# Patient Record
Sex: MALE | Race: WHITE | Employment: FULL TIME | URBAN - METROPOLITAN AREA
[De-identification: names, ages, dates, MRNs, and addresses within clinical notes are randomized per-mention and may not be internally consistent; named-entity substitution may affect disease eponyms.]

---

## 2022-09-23 NOTE — PROGRESS NOTES
[address] [place] [address] Court, [address] [facility]  [phone]    Patient Name:  Issac Gauthier  YOB: 1980      Office Visit 9/26/2022    CHIEF COMPLAINT:    Chief Complaint   Patient presents with    New Patient       HISTORY OF PRESENT ILLNESS:      The patient presents in outpatient consultation at the request of Dr. Ayaan Denton for evaluation of possible sleep apnea. Significant PMH of hypertension and GERD. Patient reports that a few weeks ago he was having problems with awaking himself during the night several times. States that for some reason this seems to have gotten better. States he lives alone and cannot remember anyone ever telling him that he snores. However he has noticed awakening especially when he falls asleep on the couch to gasp for air. Reports he usually sleeps in the side-lying position but has noticed that these awakenings happen when he is lying on his back. He denies any difficulty going to sleep at bedtime. Denies having any headaches upon awakening. Reports his normal sleep schedule is from 130-2 AM until 9:30 AM and he awakens to alarm clock. States that he sleeps 6 hours because he works in the afternoons. He denies any excessive movement or disheveled sheets. Denies any history of sleepwalking or acting out dreams. He does report a slight increase in daytime fatigue but states he never feels like he needs to take a nap. Fletcher score is 5/24. States he will drink coffee in the a.m. and may occasionally have an soda and afternoon. He denies any alcohol use, tobacco use, or illicit drug use. Reports family history of father having sleep apnea. States that his weight has been consistently around 295 pounds for years. He has been trying to increase walking to help with weight loss. He has been on blood pressure medication for about 3 to 4 years and his blood pressure is controlled today.   He denies any problems with of fever, chills, night sweats, weight loss, weight gain, persistent fatigue, or lethargy/hypersomnolence. EYES:   Denies problems with eye pain, erythema, blurred vision, or visual field loss. ENTM:   Denies history of tinnitus, epistaxis, sore throat, hoarseness, or dysphonia. LYMPH:   Denies swollen glands. CARDIAC:   No chest pain, pressure, discomfort, palpitations, orthopnea, murmurs, or edema. GI:   No dysphagia, heartburn reflux, nausea/vomiting, diarrhea, abdominal pain, or bleeding. :   Denies history of dysuria, hematuria, polyuria, or decreased urine output. MS:   No history of myalgias, arthralgias, bone pain, or muscle cramps. SKIN:   No history of rashes, jaundice, cyanosis, nodules, or ulcers. ENDO:   Negative for heat or cold intolerance. No history of DM. PSYCH:   Negative for anxiety, depression, insomnia, hallucinations. NEURO:   There is no history of AMS, persistent headache, decreased level of consciousness, seizures, or motor or sensory deficits. PHYSICAL EXAM:    Vitals:    09/26/22 1346   BP: 128/82   Pulse: 77   Resp: 15   Temp: 97.3 °F (36.3 °C)   SpO2: 97%   Weight: (!) 305 lb 6.4 oz (138.5 kg)   Height: 5' 6\" (1.676 m)        GENERAL APPEARANCE:   The patient is normal weight and in no respiratory distress. HEENT:   PERRL. Conjunctivae unremarkable. Nasal mucosa is without epistaxis, exudate, or polyps. Nares patent bilateral.  Gums and dentition are unremarkable. There is oropharyngeal narrowing. Cleaning score 4. NECK/LYMPHATIC:   Symmetrical with no elevation of jugular venous pulsation. Trachea midline. No thyroid enlargement. No cervical adenopathy. LUNGS:   Normal respiratory effort with symmetrical lung expansion. Breath sounds clear. HEART:   There is a regular rate and rhythm. No murmur, rub, or gallop. There is no edema in the lower extremities. ABDOMEN:   Soft and non-tender. No hepatosplenomegaly.   Bowel sounds are normal. SKIN:   There are no rashes, cyanosis, jaundice, or ecchymosis present. EXTREMITIES:   The extremities are unremarkable without clubbing, cyanosis, joint inflammation, degenerative, or ischemic change. MUSCULOSKELETAL:   There is no abnormal tone, muscle atrophy, or abnormal movement present. NEURO:   The patient is alert and oriented to person, place, and time. Memory appears intact and mood is normal.  No gross sensorimotor deficits are present. ASSESSMENT:  (Medical Decision Making)         ICD-10-CM    1. Suspected sleep apnea  R29.818 Ambulatory Referral to Sleep Studies -The pathophysiology of obstructive sleep apnea was reviewed with the patient. It's potential to promote severe neurologic, cardiac, pulmonary, and gastrointestinal problems was discussed. Specifically, the increased incidence of hypertension, coronary artery disease, congestive heart failure, pulmonary hypertension, gastroesophageal reflux, pathologic hypersomnolence, memory loss, and glucose intolerance was related to the consequences of hypoxemia, hypercapnia, airway obstruction, and sympathetic overdrive. We also discussed the ability of nasal CPAP to correct these abnormalities through maintenance of a patent airway. Therapeutic options including surgery, oral appliances, and weight loss were also reviewed. Patient is agreeable to starting CPAP therapy if sleep study positive for sleep apnea. 2. Snoring  R06.83 Advised to sleep in the side-lying position and avoid sleeping supine. 3. Class 3 severe obesity due to excess calories without serious comorbidity with body mass index (BMI) of 45.0 to 49.9 in adult [facility])  E66.01 Patient can lose weight including ambulating 8-10,000 steps. Can Build Up Slowly as tolerated to this goal.    Also modifying dietary intake with decrease carbohydrates and sweets.  Told to use avoid the P's --> Pizza, Pasta, Pastry, etc.  Increase fruits, vegetables, and lean meats e.g. [place] ([facility]), chicken or game meat. Patient is aware the goal is to consume less than 2000 merlin/day, since patient is a nondiabetic. We discussed using the [place] LOSE IT to count calories. Patient can police themselves. If the future, if still having issues can use a more regimented diet e.g. [place], [address] etc. Clinical correlation suggested. Z68.42       4. Hypersomnia  G47.10 Anticipate this is caused by sleep apnea and will reevaluate at follow-up. 5. Persistent disorder of initiating or maintaining sleep  G47.00 Anticipate the awakenings are being caused by snoring and undiagnosed sleep apnea. We will reevaluate at follow-up              PLAN:  Home sleep study ordered  Recommendations as above  Appropriate handouts were given to patient including information on sleep apnea, sleep hygiene and PAP therapy. Follow up with the Sleep Center will be after sleep study or after starting CPAP therapy or sooner if needed          Orders Placed This Encounter   Procedures    Ambulatory Referral to Sleep Studies     Referral Priority:   Routine     Referral Type:   Consult for Advice and Opinion     Referral Reason:   Specialty Services Required     Number of Visits Requested:   1              Collaborating Physician: Dr. Aim Boyce     Over 50% of today's office visit was spent in face to face time reviewing test results, prognosis, importance of compliance, education about disease process, benefits of medications, instructions for management of acute flare-ups, and follow up plans. Total face to face time spent with patient was 40 minutes.     [address], APRN - CNP  Electronically signed

## 2022-09-26 ENCOUNTER — OFFICE VISIT (OUTPATIENT)
Dept: SLEEP MEDICINE | Age: 42
End: 2022-09-26
Payer: COMMERCIAL

## 2022-09-26 VITALS
WEIGHT: 305.4 LBS | HEART RATE: 77 BPM | SYSTOLIC BLOOD PRESSURE: 128 MMHG | DIASTOLIC BLOOD PRESSURE: 82 MMHG | HEIGHT: 66 IN | TEMPERATURE: 97.3 F | BODY MASS INDEX: 49.08 KG/M2 | RESPIRATION RATE: 15 BRPM | OXYGEN SATURATION: 97 %

## 2022-09-26 DIAGNOSIS — R29.818 SUSPECTED SLEEP APNEA: Primary | ICD-10-CM

## 2022-09-26 DIAGNOSIS — G47.10 HYPERSOMNIA: ICD-10-CM

## 2022-09-26 DIAGNOSIS — R06.83 SNORING: ICD-10-CM

## 2022-09-26 DIAGNOSIS — E66.01 CLASS 3 SEVERE OBESITY DUE TO EXCESS CALORIES WITHOUT SERIOUS COMORBIDITY WITH BODY MASS INDEX (BMI) OF 45.0 TO 49.9 IN ADULT (HCC): ICD-10-CM

## 2022-09-26 DIAGNOSIS — G47.00 PERSISTENT DISORDER OF INITIATING OR MAINTAINING SLEEP: ICD-10-CM

## 2022-09-26 PROCEDURE — 99203 OFFICE O/P NEW LOW 30 MIN: CPT | Performed by: NURSE PRACTITIONER

## 2022-09-26 ASSESSMENT — SLEEP AND FATIGUE QUESTIONNAIRES
HOW LIKELY ARE YOU TO NOD OFF OR FALL ASLEEP WHILE SITTING AND TALKING TO SOMEONE: 0
ESS TOTAL SCORE: 5
HOW LIKELY ARE YOU TO NOD OFF OR FALL ASLEEP WHILE LYING DOWN TO REST IN THE AFTERNOON WHEN CIRCUMSTANCES PERMIT: 2
HOW LIKELY ARE YOU TO NOD OFF OR FALL ASLEEP WHILE WATCHING TV: 2
HOW LIKELY ARE YOU TO NOD OFF OR FALL ASLEEP WHILE SITTING AND READING: 0
HOW LIKELY ARE YOU TO NOD OFF OR FALL ASLEEP IN A CAR, WHILE STOPPED FOR A FEW MINUTES IN TRAFFIC: 0
HOW LIKELY ARE YOU TO NOD OFF OR FALL ASLEEP WHILE SITTING INACTIVE IN A PUBLIC PLACE: 0
HOW LIKELY ARE YOU TO NOD OFF OR FALL ASLEEP WHILE SITTING QUIETLY AFTER LUNCH WITHOUT ALCOHOL: 0
HOW LIKELY ARE YOU TO NOD OFF OR FALL ASLEEP WHEN YOU ARE A PASSENGER IN A CAR FOR AN HOUR WITHOUT A BREAK: 1

## 2022-09-26 NOTE — PATIENT INSTRUCTIONS
Home sleep study ordered  Recommendations as above  Appropriate handouts were given to patient including information on sleep apnea, sleep hygiene and PAP therapy.   Follow up with the Sleep Center will be after sleep study or after starting CPAP therapy or sooner if needed

## 2022-11-28 ENCOUNTER — HOSPITAL ENCOUNTER (OUTPATIENT)
Dept: SLEEP CENTER | Age: 42
Discharge: HOME | End: 2022-12-01

## 2022-12-08 ENCOUNTER — TELEPHONE (OUTPATIENT)
Dept: SLEEP MEDICINE | Age: 42
End: 2022-12-08

## 2022-12-08 DIAGNOSIS — G47.33 OSA (OBSTRUCTIVE SLEEP APNEA): Primary | ICD-10-CM

## 2022-12-08 NOTE — TELEPHONE ENCOUNTER
Patient had recent sleep study showing moderate sleep apnea. Cpap therapy is recommended per sleep interp. Patient has agreed to start CPAP therapy. Order needs to be sent to [address] [place] [address].

## 2023-03-24 NOTE — PROGRESS NOTES
[address] [place] [address] Court, [address] [facility]  [phone]    Patient Name:  Jayson Elaine  YOB: 1980      Office Visit 3/27/2023    CHIEF COMPLAINT:    Chief Complaint   Patient presents with    Sleep Apnea    Follow-up    CPAP/BiPAP         HISTORY OF PRESENT ILLNESS:  Patient is a 42 yo  male seen today for follow up of sleep apnea. Patient's HST revealed AHI of 17.5 including 4 central apneas, 72 obstructive apneas and 46 hypopneas. Desaturation as low as 81% seen with SpO2 less than 89% for  1.5 minutes of the test. He is prescribed cpap therapy with a humidifier set at 7-12cm with a full face mask. Most recent download reveals AHI on PAP therapy is 0.4, leak is 9.1 and the hourly usage is 6 hours 33 minutes nightly. The overall use is 87/90 days with days greater than four hours at 86/90. The patient is compliant with the Pap therapy and is feeling better as a result. This is patient's first visit since starting CPAP. Sleep study results were discussed with patient and he was given copy of sleep study. He states he has not felt much difference since starting CPAP. He does feel rested, does have occasional daytime fatigue and denies any napping. Current Eldorado score is 7/24. He is falling asleep easily and denies any frequent nocturnal awakenings. He states that the CPAP pressure feels fine. HST 11/29/2022                E[place]or[place] Sleepiness Scale  Sleep Medicine 3/27/2023 9/26/2022   Sitting and reading 1 0   Watching TV 2 2   Sitting, inactive in a public place (e.g. a theatre or a meeting) 0 0   As a passenger in a car for an hour without a break 1 1   Lying down to rest in the afternoon when circumstances permit 2 2   Sitting and talking to someone 0 0   Sitting quietly after a lunch without alcohol 1 0   In a car, while stopped for a few minutes in traffic 0 0   E[place] Sleepiness Score 7 5          History reviewed.  No

## 2023-03-27 ENCOUNTER — OFFICE VISIT (OUTPATIENT)
Dept: SLEEP MEDICINE | Age: 43
End: 2023-03-27
Payer: COMMERCIAL

## 2023-03-27 VITALS
HEART RATE: 76 BPM | HEIGHT: 66 IN | WEIGHT: 299 LBS | TEMPERATURE: 97.3 F | RESPIRATION RATE: 14 BRPM | DIASTOLIC BLOOD PRESSURE: 82 MMHG | BODY MASS INDEX: 48.05 KG/M2 | SYSTOLIC BLOOD PRESSURE: 114 MMHG | OXYGEN SATURATION: 98 %

## 2023-03-27 DIAGNOSIS — E66.01 MORBID OBESITY WITH BMI OF 45.0-49.9, ADULT (HCC): ICD-10-CM

## 2023-03-27 DIAGNOSIS — G47.33 OSA (OBSTRUCTIVE SLEEP APNEA): Primary | ICD-10-CM

## 2023-03-27 PROCEDURE — 3079F DIAST BP 80-89 MM HG: CPT | Performed by: STUDENT IN AN ORGANIZED HEALTH CARE EDUCATION/TRAINING PROGRAM

## 2023-03-27 PROCEDURE — 3074F SYST BP LT 130 MM HG: CPT | Performed by: STUDENT IN AN ORGANIZED HEALTH CARE EDUCATION/TRAINING PROGRAM

## 2023-03-27 PROCEDURE — 99214 OFFICE O/P EST MOD 30 MIN: CPT | Performed by: STUDENT IN AN ORGANIZED HEALTH CARE EDUCATION/TRAINING PROGRAM

## 2023-03-27 ASSESSMENT — SLEEP AND FATIGUE QUESTIONNAIRES
HOW LIKELY ARE YOU TO NOD OFF OR FALL ASLEEP WHILE SITTING INACTIVE IN A PUBLIC PLACE: 0
ESS TOTAL SCORE: 7
HOW LIKELY ARE YOU TO NOD OFF OR FALL ASLEEP WHILE WATCHING TV: 2
HOW LIKELY ARE YOU TO NOD OFF OR FALL ASLEEP WHILE SITTING AND TALKING TO SOMEONE: 0
HOW LIKELY ARE YOU TO NOD OFF OR FALL ASLEEP WHILE SITTING AND READING: 1
HOW LIKELY ARE YOU TO NOD OFF OR FALL ASLEEP WHILE LYING DOWN TO REST IN THE AFTERNOON WHEN CIRCUMSTANCES PERMIT: 2
HOW LIKELY ARE YOU TO NOD OFF OR FALL ASLEEP WHILE SITTING QUIETLY AFTER LUNCH WITHOUT ALCOHOL: 1
HOW LIKELY ARE YOU TO NOD OFF OR FALL ASLEEP IN A CAR, WHILE STOPPED FOR A FEW MINUTES IN TRAFFIC: 0
HOW LIKELY ARE YOU TO NOD OFF OR FALL ASLEEP WHEN YOU ARE A PASSENGER IN A CAR FOR AN HOUR WITHOUT A BREAK: 1

## 2023-09-14 NOTE — PROGRESS NOTES
[address] [place] [address]  [phone]    Patient Name:  Charlott Closs  YOB: 1980      Office Visit 9/15/2023    CHIEF COMPLAINT:    Chief Complaint   Patient presents with    Sleep Apnea         HISTORY OF PRESENT ILLNESS:  Patient is a 42 yo  male seen today for follow up of sleep apnea. Patient's sleep study in 2022 revealed AHI of 17.5 with lowest desaturation 81%. He is prescribed cpap therapy with a humidifier set at 7-12cm with a full face mask. Most recent download reveals AHI on PAP therapy is 0.2, leak is 2.9 and the hourly usage is 6 hours 51 minutes nightly. The overall use is 172/175 days with days greater than four hours at 172/175. The patient is compliant with the Pap therapy and is feeling better as a result. Patient states he is rested in the morning, denies any daytime fatigue and rarely will take a nap. Current Washington score is 4/24. He is falling asleep easily and denies any frequent nocturnal awakenings however there are some nights that he has difficulty going back to sleep if he does wake up. He states he is not often. Download        [place] Sleepiness Scale      History reviewed. No pertinent past medical history. Patient Active Problem List   Diagnosis    HTN (hypertension)    YAO (obstructive sleep apnea)          History reviewed. No pertinent surgical history.         Social History     Socioeconomic History    Marital status: Single     Spouse name: Not on file    Number of children: Not on file    Years of education: Not on file    Highest education level: Not on file   Occupational History    Not on file   Tobacco Use    Smoking status: Never    Smokeless tobacco: Never   Substance and Sexual Activity    Alcohol use: Not Currently    Drug use: Never    Sexual activity: Not on file   Other Topics Concern    Not on file   Social History Narrative    Not on file     Social Determinants of

## 2023-09-15 ENCOUNTER — OFFICE VISIT (OUTPATIENT)
Dept: SLEEP MEDICINE | Age: 43
End: 2023-09-15
Payer: COMMERCIAL

## 2023-09-15 VITALS
OXYGEN SATURATION: 97 % | HEIGHT: 67 IN | RESPIRATION RATE: 17 BRPM | HEART RATE: 92 BPM | BODY MASS INDEX: 47.1 KG/M2 | WEIGHT: 300.1 LBS | TEMPERATURE: 97.3 F | DIASTOLIC BLOOD PRESSURE: 78 MMHG | SYSTOLIC BLOOD PRESSURE: 118 MMHG

## 2023-09-15 DIAGNOSIS — G47.33 OSA (OBSTRUCTIVE SLEEP APNEA): Primary | ICD-10-CM

## 2023-09-15 DIAGNOSIS — E66.01 MORBID OBESITY WITH BMI OF 45.0-49.9, ADULT (HCC): ICD-10-CM

## 2023-09-15 PROCEDURE — 3074F SYST BP LT 130 MM HG: CPT | Performed by: STUDENT IN AN ORGANIZED HEALTH CARE EDUCATION/TRAINING PROGRAM

## 2023-09-15 PROCEDURE — 99213 OFFICE O/P EST LOW 20 MIN: CPT | Performed by: STUDENT IN AN ORGANIZED HEALTH CARE EDUCATION/TRAINING PROGRAM

## 2023-09-15 PROCEDURE — 3078F DIAST BP <80 MM HG: CPT | Performed by: STUDENT IN AN ORGANIZED HEALTH CARE EDUCATION/TRAINING PROGRAM

## 2023-09-15 ASSESSMENT — SLEEP AND FATIGUE QUESTIONNAIRES
HOW LIKELY ARE YOU TO NOD OFF OR FALL ASLEEP WHILE WATCHING TV: 1
HOW LIKELY ARE YOU TO NOD OFF OR FALL ASLEEP WHILE SITTING INACTIVE IN A PUBLIC PLACE: 0
HOW LIKELY ARE YOU TO NOD OFF OR FALL ASLEEP WHEN YOU ARE A PASSENGER IN A CAR FOR AN HOUR WITHOUT A BREAK: 0
HOW LIKELY ARE YOU TO NOD OFF OR FALL ASLEEP WHILE LYING DOWN TO REST IN THE AFTERNOON WHEN CIRCUMSTANCES PERMIT: 2
HOW LIKELY ARE YOU TO NOD OFF OR FALL ASLEEP IN A CAR, WHILE STOPPED FOR A FEW MINUTES IN TRAFFIC: 0
HOW LIKELY ARE YOU TO NOD OFF OR FALL ASLEEP WHILE SITTING AND READING: 1
HOW LIKELY ARE YOU TO NOD OFF OR FALL ASLEEP WHILE SITTING AND TALKING TO SOMEONE: 0
HOW LIKELY ARE YOU TO NOD OFF OR FALL ASLEEP WHILE SITTING QUIETLY AFTER LUNCH WITHOUT ALCOHOL: 0
ESS TOTAL SCORE: 4

## 2023-09-26 ENCOUNTER — INITIAL CONSULT (OUTPATIENT)
Age: 43
End: 2023-09-26
Payer: COMMERCIAL

## 2023-09-26 VITALS
BODY MASS INDEX: 47.89 KG/M2 | HEIGHT: 66 IN | DIASTOLIC BLOOD PRESSURE: 98 MMHG | SYSTOLIC BLOOD PRESSURE: 144 MMHG | HEART RATE: 106 BPM | WEIGHT: 298 LBS

## 2023-09-26 DIAGNOSIS — R06.09 DOE (DYSPNEA ON EXERTION): ICD-10-CM

## 2023-09-26 DIAGNOSIS — I10 PRIMARY HYPERTENSION: Primary | ICD-10-CM

## 2023-09-26 DIAGNOSIS — E66.01 CLASS 3 SEVERE OBESITY DUE TO EXCESS CALORIES WITHOUT SERIOUS COMORBIDITY WITH BODY MASS INDEX (BMI) OF 45.0 TO 49.9 IN ADULT (HCC): ICD-10-CM

## 2023-09-26 DIAGNOSIS — Z82.49 FAMILY HISTORY OF CARDIOVASCULAR DISEASE: ICD-10-CM

## 2023-09-26 DIAGNOSIS — R94.31 ABNORMAL EKG: ICD-10-CM

## 2023-09-26 PROBLEM — E66.813 CLASS 3 SEVERE OBESITY DUE TO EXCESS CALORIES WITHOUT SERIOUS COMORBIDITY WITH BODY MASS INDEX (BMI) OF 45.0 TO 49.9 IN ADULT: Status: ACTIVE | Noted: 2023-09-26

## 2023-09-26 PROCEDURE — 99203 OFFICE O/P NEW LOW 30 MIN: CPT | Performed by: INTERNAL MEDICINE

## 2023-09-26 PROCEDURE — 3080F DIAST BP >= 90 MM HG: CPT | Performed by: INTERNAL MEDICINE

## 2023-09-26 PROCEDURE — 93000 ELECTROCARDIOGRAM COMPLETE: CPT | Performed by: INTERNAL MEDICINE

## 2023-09-26 PROCEDURE — 3077F SYST BP >= 140 MM HG: CPT | Performed by: INTERNAL MEDICINE

## 2023-09-26 ASSESSMENT — ENCOUNTER SYMPTOMS
DIARRHEA: 0
HEMATOCHEZIA: 0
BLURRED VISION: 0
ABDOMINAL PAIN: 0
COLOR CHANGE: 0
ORTHOPNEA: 0
HOARSE VOICE: 0
SPUTUM PRODUCTION: 0
WHEEZING: 0
SHORTNESS OF BREATH: 1
BOWEL INCONTINENCE: 0
HEMATEMESIS: 0

## 2023-10-31 ENCOUNTER — OFFICE VISIT (OUTPATIENT)
Age: 43
End: 2023-10-31

## 2023-10-31 VITALS
WEIGHT: 298 LBS | DIASTOLIC BLOOD PRESSURE: 82 MMHG | HEIGHT: 66 IN | BODY MASS INDEX: 47.89 KG/M2 | HEART RATE: 80 BPM | SYSTOLIC BLOOD PRESSURE: 136 MMHG

## 2023-10-31 DIAGNOSIS — I10 PRIMARY HYPERTENSION: Primary | ICD-10-CM

## 2023-10-31 DIAGNOSIS — R06.09 DOE (DYSPNEA ON EXERTION): ICD-10-CM

## 2023-10-31 DIAGNOSIS — Z82.49 FAMILY HISTORY OF CARDIOVASCULAR DISEASE: ICD-10-CM

## 2023-10-31 ASSESSMENT — ENCOUNTER SYMPTOMS
SPUTUM PRODUCTION: 0
BOWEL INCONTINENCE: 0
HEMATOCHEZIA: 0
COLOR CHANGE: 0
ABDOMINAL PAIN: 0
HOARSE VOICE: 0
SHORTNESS OF BREATH: 1
HEMATEMESIS: 0
BLURRED VISION: 0
WHEEZING: 0
DIARRHEA: 0
ORTHOPNEA: 0

## 2023-10-31 NOTE — PROGRESS NOTES
[facility] CARDIOLOGY  [address] [address]  PHONE: [phone]        10/31/23        NAME:  Cristóbal Nunez  : 1980  MRN: [id number]       SUBJECTIVE:   Cristóbal Nunez is a 43 y.o. male seen for a follow up visit regarding the following: Recently,the patient was referred by his PCP cardiac evaluation for dyspnea in the setting of primary hypertension,FH CAD,and obesity. He had a follow up negative stress MPI scan ( no ischemia & LV EF=70%). Echo revealed normal normal LV systolic and diastolic function with no significant valvular heart disease. He returns for test result discussion. I discussed test results with the patient. Chief Complaint   Patient presents with    Hypertension    Results     Echo/nuc results         HPI:    Hypertension  This is a chronic problem. The problem is unchanged. The problem is controlled. Associated symptoms include shortness of breath. Pertinent negatives include no anxiety, blurred vision, chest pain, headaches, malaise/fatigue, neck pain, orthopnea, palpitations, peripheral edema, PND or sweats. Past Medical History, Past Surgical History, Family history, Social History, and Medications were all reviewed with the patient today and updated as necessary.          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
Linked

## 2024-09-29 NOTE — PROGRESS NOTES
[facility]  Phone: [address] [place] [address]  Dept: [phone]      Patient Name: Jayden Harding  : 1980  Gender: male  Address: [address] [place] [address] [place] [address]  Patient phone: [phone] (home)       Primary Insurance: Payor: BC / Plan: [employer] [place] STATE / Product Type: *No Product type* /   Subscriber ID: [id number] - ([facility])      AMB Supply Order  Order Details     DME Location:[facility]   Order Date: 2024   The primary encounter diagnosis was YAO (obstructive sleep apnea). A diagnosis of Morbid obesity with BMI of 45.0-49.9, adult was also pertinent to this visit.             (  X   )Supplies Needed        Machine   (     ) CPAP Unit  (     ) Auto CPAP Unit  (     ) BiLevel Unit  (     ) Auto BiLevel Unit  (     ) ASV   (     ) Bilevel ST      Length of need: 12 months    Pressure:   cmH20  EPR:      Starting Ramp Pressure:   cm H20  Ramp Time: min      Patient had a diagnostic Apnea Hypopnea Index (AHI) of :    *SUPPLIES* Replace all as needed, or per coverage guidelines     Masks Type:  ( x   ) -Full Face Mask (1 per 3 mon)  (  x  ) -Full Mask (1 per month) Interface/Cushion      (  ) -Nasal Mask (1 per 3 mon)  (  ) - Nasal Mask (1 per month) Interface/Cushion  (     ) -Pillow (2 per mon)  (     ) -Somotysnw (1 per 6 mon)            Other Supplies:    (   X  )-Pizcxefx (1 per 6 mon)  (   X  )-Tgycco Tubing (1 per 3 mon)  (   X  )- Disposable Filter (2 per mon)  (   x  )-Eoaytn Humidifier (1 per year)     ( x    )-Magbnoylg (sometimes used with Full Face Mask) (1 per 6 mos)  (    )-Tubing-without heat (1 per 3 mos)  (     )-Non-Disposable Filter (1 per 6 mos)  (  x   )-Water Chamber (1 per 6 mos)  (     )-Humidifier non-heated (1 per 5 yrs)      Signed Date: 2024  Electronically Signed By: Rose Cheung,

## 2024-09-30 ENCOUNTER — TELEMEDICINE (OUTPATIENT)
Dept: SLEEP MEDICINE | Age: 44
End: 2024-09-30
Payer: COMMERCIAL

## 2024-09-30 DIAGNOSIS — E66.01 MORBID OBESITY WITH BMI OF 45.0-49.9, ADULT: ICD-10-CM

## 2024-09-30 DIAGNOSIS — G47.33 OSA (OBSTRUCTIVE SLEEP APNEA): Primary | ICD-10-CM

## 2024-09-30 PROCEDURE — 99213 OFFICE O/P EST LOW 20 MIN: CPT | Performed by: PHYSICIAN ASSISTANT

## 2024-09-30 ASSESSMENT — SLEEP AND FATIGUE QUESTIONNAIRES
HOW LIKELY ARE YOU TO NOD OFF OR FALL ASLEEP WHILE SITTING QUIETLY AFTER LUNCH WITHOUT ALCOHOL: WOULD NEVER DOZE
HOW LIKELY ARE YOU TO NOD OFF OR FALL ASLEEP WHILE SITTING AND READING: SLIGHT CHANCE OF DOZING
ESS TOTAL SCORE: 5
HOW LIKELY ARE YOU TO NOD OFF OR FALL ASLEEP WHILE SITTING INACTIVE IN A PUBLIC PLACE: WOULD NEVER DOZE
HOW LIKELY ARE YOU TO NOD OFF OR FALL ASLEEP IN A CAR, WHILE STOPPED FOR A FEW MINUTES IN TRAFFIC: WOULD NEVER DOZE
HOW LIKELY ARE YOU TO NOD OFF OR FALL ASLEEP WHILE WATCHING TV: MODERATE CHANCE OF DOZING
HOW LIKELY ARE YOU TO NOD OFF OR FALL ASLEEP WHEN YOU ARE A PASSENGER IN A CAR FOR AN HOUR WITHOUT A BREAK: WOULD NEVER DOZE
HOW LIKELY ARE YOU TO NOD OFF OR FALL ASLEEP WHILE SITTING AND TALKING TO SOMEONE: WOULD NEVER DOZE
HOW LIKELY ARE YOU TO NOD OFF OR FALL ASLEEP WHILE LYING DOWN TO REST IN THE AFTERNOON WHEN CIRCUMSTANCES PERMIT: MODERATE CHANCE OF DOZING